# Patient Record
Sex: MALE | Race: WHITE
[De-identification: names, ages, dates, MRNs, and addresses within clinical notes are randomized per-mention and may not be internally consistent; named-entity substitution may affect disease eponyms.]

---

## 2018-08-06 ENCOUNTER — HOSPITAL ENCOUNTER (EMERGENCY)
Dept: HOSPITAL 57 - BURERS | Age: 33
Discharge: HOME | End: 2018-08-06
Payer: SELF-PAY
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
## 2018-08-06 DIAGNOSIS — S23.3XXA: Primary | ICD-10-CM

## 2018-08-06 DIAGNOSIS — F17.210: ICD-10-CM

## 2018-08-06 PROCEDURE — 96372 THER/PROPH/DIAG INJ SC/IM: CPT

## 2018-09-05 ENCOUNTER — HOSPITAL ENCOUNTER (EMERGENCY)
Dept: HOSPITAL 92 - ERS | Age: 33
Discharge: HOME | End: 2018-09-05
Payer: SELF-PAY
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
## 2018-09-05 DIAGNOSIS — F17.210: ICD-10-CM

## 2018-09-05 DIAGNOSIS — S82.832A: Primary | ICD-10-CM

## 2018-09-05 NOTE — RAD
THREE VIEWS OF THE LEFT FOOT:

9/5/18

 

COMPARISON:  

None. 

 

HISTORY: 

Left foot pain. 

 

FINDINGS:  

Three views of the left foot shows no evidence of acute fracture or dislocation. Mild diffuse soft ti
ssue swelling is seen. There is mild first metatarsal head hyperplasia. 

 

IMPRESSION:  

No evidence of acute osseous abnormality. 

 

POS: C

## 2018-09-05 NOTE — RAD
THREE VIEWS OF THE LEFT ANKLE:

9/5/18

 

COMPARISON:  

None.

 

HISTORY: 

Left ankle pain and swelling. 

 

FINDINGS:  

Three views left ankle shows a minimally displaced fracture of the distal fibula. No tibial fracture 
is seen. Moderate soft tissue swelling is seen. 

 

IMPRESSION:  

Minimally displaced distal fibula fracture. 

 

POS: C

## 2019-05-29 ENCOUNTER — HOSPITAL ENCOUNTER (EMERGENCY)
Dept: HOSPITAL 57 - BURERS | Age: 34
Discharge: HOME | End: 2019-05-29
Payer: SELF-PAY
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
## 2019-05-29 DIAGNOSIS — S62.654A: Primary | ICD-10-CM

## 2019-05-29 DIAGNOSIS — F17.210: ICD-10-CM

## 2019-05-29 NOTE — RAD
3 VIEWS RIGHT RING FINGER:

 

Date:  05/29/19 

 

HISTORY:  

Ring finger pain after punching a wall. 

 

FINDINGS:

Three views of the right ring finger shows lucency on the lateral radiograph along the base of the mi
ddle phalanx near the PIP joint. This could represent a small fracture of the volar plate in this loc
ation. No dislocation is seen. 

 

IMPRESSION: 

Possible small fracture of the base of the middle phalanx. 

 

 

POS: JEROME

## 2020-02-18 ENCOUNTER — HOSPITAL ENCOUNTER (EMERGENCY)
Dept: HOSPITAL 57 - BURERS | Age: 35
Discharge: HOME | End: 2020-02-18
Payer: SELF-PAY

## 2020-02-18 DIAGNOSIS — F17.210: ICD-10-CM

## 2020-02-18 DIAGNOSIS — I10: ICD-10-CM

## 2020-02-18 DIAGNOSIS — M54.12: Primary | ICD-10-CM

## 2020-02-18 PROCEDURE — 99283 EMERGENCY DEPT VISIT LOW MDM: CPT
